# Patient Record
Sex: FEMALE | URBAN - METROPOLITAN AREA
[De-identification: names, ages, dates, MRNs, and addresses within clinical notes are randomized per-mention and may not be internally consistent; named-entity substitution may affect disease eponyms.]

---

## 2022-12-12 ENCOUNTER — OFFICE VISIT (OUTPATIENT)
Dept: URBAN - METROPOLITAN AREA CLINIC 83 | Facility: CLINIC | Age: 62
End: 2022-12-12

## 2022-12-12 DIAGNOSIS — D31.32 BENIGN NEOPLASM OF LEFT CHOROID: Primary | ICD-10-CM

## 2022-12-12 PROCEDURE — 92134 CPTRZ OPH DX IMG PST SGM RTA: CPT | Performed by: OPHTHALMOLOGY

## 2022-12-12 PROCEDURE — 99204 OFFICE O/P NEW MOD 45 MIN: CPT | Performed by: OPHTHALMOLOGY

## 2022-12-12 PROCEDURE — 76512 OPH US DX B-SCAN: CPT | Performed by: OPHTHALMOLOGY

## 2022-12-12 ASSESSMENT — INTRAOCULAR PRESSURE
OD: 13
OS: 14

## 2022-12-12 NOTE — IMPRESSION/PLAN
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
--OCT reveals normal macular contour
--B-scan shows 1.9 mm elevation (1.95 mm in 2013) --no evidence of SRF, EOE, or orange pigment
--findings/diagnosis discussed with pt 
--very small risk of malignant transformation
--monitor annually RTC 12 months for DFE OS